# Patient Record
Sex: MALE
[De-identification: names, ages, dates, MRNs, and addresses within clinical notes are randomized per-mention and may not be internally consistent; named-entity substitution may affect disease eponyms.]

---

## 2021-09-28 ENCOUNTER — NURSE TRIAGE (OUTPATIENT)
Dept: OTHER | Facility: CLINIC | Age: 61
End: 2021-09-28

## 2021-09-28 NOTE — TELEPHONE ENCOUNTER
Reason for Disposition  Rusty Shah Caller has already spoken with another triager or PCP AND has further questions AND triager able to answer questions. Protocols used: NO CONTACT OR DUPLICATE CONTACT CALL-ADULT-AH    Brief description of triage: Caller is looking for in network provider for oncology. Cannot find Abimbola Fuentes with her plan. Triage indicates for patient to call [address] [place] [address] customer service for a more detailed search    Care advice provided, patient verbalizes understanding; denies any other questions or concerns; instructed to call back for any new or worsening symptoms. This triage is a result of a call to [address] [place] [address]. Please do not respond to the triage nurse through this encounter. Any subsequent communication should be directly with the patient.

## 2021-09-29 ENCOUNTER — NURSE TRIAGE (OUTPATIENT)
Dept: OTHER | Facility: CLINIC | Age: 61
End: 2021-09-29

## 2021-09-29 NOTE — TELEPHONE ENCOUNTER
Brief description of triage: was diagnosed with stage four end stage cancer the oncologist whom they scheduled an appointment with sarah his appointment due to the oncologist is out of network wife is requesting information on she is now wanting to start a network deficiency, she spoke with Jed Chahal yesterday and today Soraida called the office and said pan Isbell. Roly Meehan is out of network although he is listed in network on O site, she was told by [facility] insurance department to speak to nurse for this and     Triage indicates for patient to contact PCP or surgeon office as needed with     Care advice provided, patient verbalizes understanding; denies any other questions or concerns; instructed to call back for any new or worsening symptoms. This triage is a result of a call to [address] [place] [address]. Please do not respond to the triage nurse through this encounter. Any subsequent communication should be directly with the patient.       Reason for Disposition   NON-URGENT call redirected to PCP's office because it is open    Protocols used: NO CONTACT OR DUPLICATE CONTACT CALL-ADULT-